# Patient Record
Sex: MALE | Race: WHITE
[De-identification: names, ages, dates, MRNs, and addresses within clinical notes are randomized per-mention and may not be internally consistent; named-entity substitution may affect disease eponyms.]

---

## 2021-03-01 ENCOUNTER — HOSPITAL ENCOUNTER (EMERGENCY)
Dept: HOSPITAL 7 - FB.ED | Age: 56
Discharge: HOME | End: 2021-03-01
Payer: COMMERCIAL

## 2021-03-01 DIAGNOSIS — S01.81XA: Primary | ICD-10-CM

## 2021-03-01 DIAGNOSIS — E78.00: ICD-10-CM

## 2021-03-01 DIAGNOSIS — Z88.5: ICD-10-CM
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
## 2021-03-01 DIAGNOSIS — Z79.899: ICD-10-CM

## 2021-03-01 DIAGNOSIS — S63.502A: ICD-10-CM

## 2021-03-01 DIAGNOSIS — S09.90XA: ICD-10-CM

## 2021-03-01 DIAGNOSIS — I10: ICD-10-CM

## 2021-03-01 DIAGNOSIS — S20.213A: ICD-10-CM

## 2021-03-01 DIAGNOSIS — Z91.030: ICD-10-CM

## 2021-03-01 RX ADMIN — KETOROLAC TROMETHAMINE ONE MG: 60 INJECTION, SOLUTION INTRAMUSCULAR at 08:18

## 2021-03-01 NOTE — EDM.PDOC
ED HPI GENERAL MEDICAL PROBLEM





- General


Chief Complaint: Head Injury


Stated Complaint: FALL-HEAD INJURY


Time Seen by Provider: 03/01/21 06:45


Source of Information: Reports: Patient


History Limitations: Reports: No Limitations





- History of Present Illness


INITIAL COMMENTS - FREE TEXT/NARRATIVE: 





c/o fall





pt from [place], , at [facility], on the step of his truck and 

reached too far to release a tarp, foot slipped and he fell ~4' to the ground, 

no LOC, witness, c/o pain in forehead/HA, tingling in L hand, minor L wrist 

discomfort, R ib pain





h/o concussion x 2 altho thinking is clear





- Related Data


                                    Allergies











Allergy/AdvReac Type Severity Reaction Status Date / Time


 


tramadol Allergy  throat Verified 03/01/21 08:05





   closes,  





   swelling  


 


bee stings Allergy  throat Uncoded 03/01/21 08:06





   closes,  





   swelling  











Home Meds: 


                                    Home Meds





Diclofenac Sodium 75 mg PO DAILY 03/01/21 [History]


FLUoxetine HCl [Fluoxetine HCl] 40 mg PO DAILY 03/01/21 [History]


Levothyroxine 150 mcg PO ACBREAKFAST 03/01/21 [History]


Tamsulosin [Tamsulosin 24 Hr] 0.4 mg PO DAILY 03/01/21 [History]


atorvaSTATin [Lipitor] 10 mg PO DAILY 03/01/21 [History]


buPROPion [buPROPion XL] 150 mg PO DAILY 03/01/21 [History]


lisinopriL [Lisinopril] 10 mg PO DAILY 03/01/21 [History]











Past Medical History


Cardiovascular History: Reports: High Cholesterol, Hypertension


Genitourinary History: Reports: Other (See Below)


Other Genitourinary History: Takes Flomax.


Neurological History: Reports: Concussion, Other (See Below)


Other Neuro History: Head injury 3-1-21.  States he has past history of 2 

concussions.


Psychiatric History: Reports: Anxiety, Depression


Endocrine/Metabolic History: Reports: Other (See Below)


Other Endocrine/Metabolic History: Thyroid removed, takes Levothyroxine.





ED ROS GENERAL





- Review of Systems


Review Of Systems: See Below


Constitutional: Reports: No Symptoms


HEENT: Reports: No Symptoms


Respiratory: Reports: No Symptoms, Other (rib pain)


Cardiovascular: Reports: No Symptoms


Endocrine: Reports: No Symptoms


GI/Abdominal: Reports: No Symptoms


: Reports: No Symptoms


Musculoskeletal: Reports: No Symptoms


Skin: Reports: No Symptoms


Neurological: Reports: Headache


Psychiatric: Reports: No Symptoms


Hematologic/Lymphatic: Reports: No Symptoms


Immunologic: Reports: No Symptoms





ED EXAM, HEAD INJURY





- Physical Exam


Exam: See Below


Exam Limited By: No Limitations


General Appearance: Alert, WD/WN, Mild Distress


Head: Other (2 cm horizontal lac in mid forehead, mod bleeding and swell, no 

f.b., 1% lido without local with #30 needle, 3-0 Ethilon x 3 closure).  No: 

Raccoon Eyes


Eyes: Bilateral Eye: EOMI, PERRL


Ears: Hearing Grossly Normal


Nose: Normal Inspection


Throat/Mouth: Normal Voice, No Airway Compromise


Neck: Non-Tender, Full Range of Motion, Normal Alignment, Normal Inspection


Respiratory: No Respiratory Distress, Lungs Clear, Normal Breath Sounds


Cardiovascular: Regular Rate, Rhythm, No Murmur


GI/Abdominal Exam: Soft, Non-Tender


Back Exam: Full Range of Motion, Normal Inspection, NT


Extremities: Normal Inspection, Normal Range of Motion, Non-Tender, No Pedal 

Edema


Neurologic: No Motor/Sensory Deficits, Alert, Normal Mood/Affect, Oriented x 3, 

Other (alert, pleasant, cognitive intact, no speech)


Skin: Normal Color, Warm/Dry





Course





- Vital Signs


Last Recorded V/S: 





                                Last Vital Signs











Temp  36.2 C   03/01/21 06:55


 


Pulse  64   03/01/21 07:48


 


Resp  18   03/01/21 07:48


 


BP  139/81   03/01/21 07:48


 


Pulse Ox  100   03/01/21 07:48














- Orders/Labs/Meds


Orders: 





                               Active Orders 24 hr











 Category Date Time Status


 


 Head wo Cont [CT] Stat Exams  03/01/21 07:15 Taken


 


 Ribs 3V w Chest Bi [CR] Stat Exams  03/01/21 07:13 Taken











Meds: 





Medications














Discontinued Medications














Generic Name Dose Route Start Last Admin





  Trade Name Anna  PRN Reason Stop Dose Admin


 


Acetaminophen  1,000 mg  03/01/21 07:23  03/01/21 07:50





  Tylenol Extra Strength  PO  03/01/21 07:24  1,000 mg





  ONETIME ONE   Administration


 


Ketorolac Tromethamine  60 mg  03/01/21 08:13  03/01/21 08:18





  Toradol  IM  03/01/21 08:14  60 mg





  ONETIME ONE   Administration














Departure





- Departure


Time of Disposition: 08:43


Disposition: Home, Self-Care 01


Preliminary Cause of Death *Q: Sepsis & Multi System Organ Failure


Condition: Good


Clinical Impression: 


 Head injury, Forehead laceration, Contusion of ribs, Left wrist sprain








- Discharge Information


*PRESCRIPTION DRUG MONITORING PROGRAM REVIEWED*: Not Applicable


*COPY OF PRESCRIPTION DRUG MONITORING REPORT IN PATIENT [place]: Not Applicable


Instructions:  Head Injury, Adult


Additional Instructions: 


For pain and inflammation, take ibuprofen 200 mg 4 tabs and acetaminophen 500 mg

 2 tabs 3 times a day for 5 days, longer if needed.





Use ice for 10 minutes 4 times a day for 2 days.





Rest.





No work for 2 days.





See a doctor the same day for any increase in redness, swelling, pain, warmth, 

fever or drainage.





See your doctor in 7 days to remove sutures.





See your doctor earlier if you are feeling worse.





Sepsis Event Note (ED)





- Evaluation


Sepsis Screening Result: No Definite Risk





- Focused Exam


Vital Signs: 





                                   Vital Signs











  Temp Pulse Pulse Resp BP Pulse Ox


 


 03/01/21 07:48   64   18  139/81  100


 


 03/01/21 06:55  36.2 C   70  18  143/76 H  100














- My Orders


Last 24 Hours: 





My Active Orders





03/01/21 07:13


Ribs 3V w Chest Bi [CR] Stat 





03/01/21 07:15


Head wo Cont [CT] Stat 














- Assessment/Plan


Last 24 Hours: 





My Active Orders





03/01/21 07:13


Ribs 3V w Chest Bi [CR] Stat 





03/01/21 07:15


Head wo Cont [CT] Stat